# Patient Record
Sex: MALE | Race: WHITE | NOT HISPANIC OR LATINO | URBAN - METROPOLITAN AREA
[De-identification: names, ages, dates, MRNs, and addresses within clinical notes are randomized per-mention and may not be internally consistent; named-entity substitution may affect disease eponyms.]

---

## 2024-09-04 ENCOUNTER — EMERGENCY (EMERGENCY)
Facility: HOSPITAL | Age: 24
LOS: 0 days | Discharge: HOME | End: 2024-09-04
Attending: HOSPITALIST
Payer: COMMERCIAL

## 2024-09-04 VITALS
OXYGEN SATURATION: 100 % | DIASTOLIC BLOOD PRESSURE: 68 MMHG | SYSTOLIC BLOOD PRESSURE: 126 MMHG | TEMPERATURE: 98 F | HEART RATE: 61 BPM | RESPIRATION RATE: 16 BRPM | WEIGHT: 179.9 LBS | HEIGHT: 76 IN

## 2024-09-04 VITALS
RESPIRATION RATE: 15 BRPM | HEART RATE: 65 BPM | DIASTOLIC BLOOD PRESSURE: 73 MMHG | SYSTOLIC BLOOD PRESSURE: 120 MMHG | TEMPERATURE: 98 F | OXYGEN SATURATION: 100 %
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
## 2024-09-04 DIAGNOSIS — M79.674 PAIN IN RIGHT TOE(S): ICD-10-CM
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
## 2024-09-04 PROCEDURE — 99284 EMERGENCY DEPT VISIT MOD MDM: CPT

## 2024-09-04 PROCEDURE — 73630 X-RAY EXAM OF FOOT: CPT | Mod: 26,RT

## 2024-09-04 PROCEDURE — 99283 EMERGENCY DEPT VISIT LOW MDM: CPT | Mod: 25

## 2024-09-04 PROCEDURE — 73630 X-RAY EXAM OF FOOT: CPT | Mod: RT

## 2024-09-04 RX ORDER — IBUPROFEN 600 MG
600 TABLET ORAL ONCE
Refills: 0 | Status: COMPLETED | OUTPATIENT
Start: 2024-09-04 | End: 2024-09-04

## 2024-09-04 RX ADMIN — Medication 600 MILLIGRAM(S): at 01:31

## 2024-09-04 NOTE — ED PROVIDER NOTE - PHYSICAL EXAMINATION
Constitutional: NAD AAOx3  Eyes: PERRLA EOMI  Head: Normocephalic atraumatic  Mouth: MMM  Cardiac: regular rate   Resp: Lungs CTAB  GI: Abd s/nt/nd  Neuro: CN2-12 intact  Ext: + ttp over distal tip of right great toe. + mild swelling. no obvious deformity.  Skin: No visible rashes

## 2024-09-04 NOTE — ED ADULT NURSE NOTE - OBJECTIVE STATEMENT
pt c/o right ankle pain. pt states pain and swelling started after soccer practice. right ankle appears more swollen than left. pt ambulatory, able to put slight pressure on right foot. Pt denies CP, SOB, n/v/d, HA/dizziness at this time

## 2024-09-04 NOTE — ED PROVIDER NOTE - OBJECTIVE STATEMENT
24-year-old male presents with right foot pain since earlier this evening.  Patient was playing soccer and was wearing his cleats and kicked the ball.  Developed right great toe pain which has been radiating to the top of his foot.  Did try taking Tylenol prior to arrival without any improvement of pain.  Does note mild swelling.  Can fully range his toe despite pain.

## 2024-09-04 NOTE — ED PROVIDER NOTE - NSFOLLOWUPINSTRUCTIONS_ED_ALL_ED_FT
Take tylenol as needed for pain, 650Mg every 6-8 hours.  You can also take ibuprofen as needed for pain, 600mg every 6-8 hours, take with food.  keep right foot elevated when at rest

## 2024-09-04 NOTE — ED ADULT NURSE NOTE - ISOLATION TYPE:
Pt discharged from ED at this time with all necessary paperwork. All questions answered at this time and discharge instructions reviewed. Pt ambulates to waiting room.       Gurdeep Hernandez RN  01/29/23 1941 None

## 2024-09-04 NOTE — ED ADULT TRIAGE NOTE - CHIEF COMPLAINT QUOTE
Pt ambulatory to ED w c/o R big toe injury while playing soccer this evening. Pt states he felt the pain when he kicked the ball. Pt able to move toes and pt has sensation in R foot. Swelling noted to R foot. Pt denies any other injuries at this time. No obvious deformities or bleeding noted

## 2024-09-04 NOTE — ED PROVIDER NOTE - PATIENT PORTAL LINK FT
You can access the FollowMyHealth Patient Portal offered by [facility] by registering at the following website: [email or web address]. By joining [employer]’s FollowMyHealth portal, you will also be able to view your health information using other applications (apps) compatible with our system.